# Patient Record
Sex: MALE | Race: WHITE | NOT HISPANIC OR LATINO
[De-identification: names, ages, dates, MRNs, and addresses within clinical notes are randomized per-mention and may not be internally consistent; named-entity substitution may affect disease eponyms.]

---

## 2017-04-17 ENCOUNTER — RX RENEWAL (OUTPATIENT)
Age: 62
End: 2017-04-17

## 2017-05-19 ENCOUNTER — RX RENEWAL (OUTPATIENT)
Age: 62
End: 2017-05-19

## 2018-05-03 ENCOUNTER — RX RENEWAL (OUTPATIENT)
Age: 63
End: 2018-05-03

## 2018-06-09 ENCOUNTER — RX RENEWAL (OUTPATIENT)
Age: 63
End: 2018-06-09

## 2018-09-28 ENCOUNTER — RX RENEWAL (OUTPATIENT)
Age: 63
End: 2018-09-28

## 2019-01-07 ENCOUNTER — RX RENEWAL (OUTPATIENT)
Age: 64
End: 2019-01-07

## 2019-02-06 ENCOUNTER — APPOINTMENT (OUTPATIENT)
Dept: GASTROENTEROLOGY | Facility: CLINIC | Age: 64
End: 2019-02-06
Payer: COMMERCIAL

## 2019-02-06 ENCOUNTER — APPOINTMENT (OUTPATIENT)
Dept: INTERNAL MEDICINE | Facility: CLINIC | Age: 64
End: 2019-02-06
Payer: COMMERCIAL

## 2019-02-06 VITALS
DIASTOLIC BLOOD PRESSURE: 86 MMHG | TEMPERATURE: 98.7 F | HEIGHT: 66 IN | SYSTOLIC BLOOD PRESSURE: 142 MMHG | OXYGEN SATURATION: 97 % | WEIGHT: 167 LBS | HEART RATE: 81 BPM | BODY MASS INDEX: 26.84 KG/M2

## 2019-02-06 VITALS
SYSTOLIC BLOOD PRESSURE: 140 MMHG | HEIGHT: 66 IN | BODY MASS INDEX: 26.84 KG/M2 | TEMPERATURE: 98.7 F | OXYGEN SATURATION: 97 % | WEIGHT: 167 LBS | HEART RATE: 81 BPM | DIASTOLIC BLOOD PRESSURE: 80 MMHG

## 2019-02-06 VITALS — SYSTOLIC BLOOD PRESSURE: 130 MMHG | DIASTOLIC BLOOD PRESSURE: 78 MMHG

## 2019-02-06 DIAGNOSIS — R19.8 OTHER SPECIFIED SYMPTOMS AND SIGNS INVOLVING THE DIGESTIVE SYSTEM AND ABDOMEN: ICD-10-CM

## 2019-02-06 DIAGNOSIS — Z12.5 ENCOUNTER FOR SCREENING FOR MALIGNANT NEOPLASM OF PROSTATE: ICD-10-CM

## 2019-02-06 DIAGNOSIS — R94.31 ABNORMAL ELECTROCARDIOGRAM [ECG] [EKG]: ICD-10-CM

## 2019-02-06 DIAGNOSIS — Z11.4 ENCOUNTER FOR SCREENING FOR HUMAN IMMUNODEFICIENCY VIRUS [HIV]: ICD-10-CM

## 2019-02-06 DIAGNOSIS — Z83.3 FAMILY HISTORY OF DIABETES MELLITUS: ICD-10-CM

## 2019-02-06 DIAGNOSIS — R01.1 CARDIAC MURMUR, UNSPECIFIED: ICD-10-CM

## 2019-02-06 PROCEDURE — 99396 PREV VISIT EST AGE 40-64: CPT | Mod: 25

## 2019-02-06 PROCEDURE — 99203 OFFICE O/P NEW LOW 30 MIN: CPT

## 2019-02-06 PROCEDURE — 93000 ELECTROCARDIOGRAM COMPLETE: CPT

## 2019-02-06 NOTE — HISTORY OF PRESENT ILLNESS
[FreeTextEntry1] : 62 yo with h/o GERD, ppi was given and patient ran out 2 weeks ago, and patient with c/o gerd daily. no n/v. normal bms, no brbpr, no melena.  unintentional weight loss. no abdominal pain\par \par s/p egd 2015,intestinal cell metaplasia, c/w Mckeon's esophagus,no  gastritis, no h.pylori.\par \par s/p colonoscopy 2015 diverticulosis, \par \par Father with h/o colon cancer, over the age of 50

## 2019-02-06 NOTE — REVIEW OF SYSTEMS
[As Noted in HPI] : as noted in HPI [Fever] : no fever [Chills] : no chills [Red Eyes] : eyes not red [Nosebleeds] : no nosebleeds [Chest Pain] : no chest pain [Shortness Of Breath] : no shortness of breath [Arthralgias] : no arthralgias [Joint Pain] : no joint pain [Skin Lesions] : no skin lesions [Anxiety] : anxiety [Depression] : no depression [Easy Bleeding] : no tendency for easy bleeding [Easy Bruising] : no tendency for easy bruising

## 2019-02-06 NOTE — PHYSICAL EXAM
[General Appearance - Alert] : alert [General Appearance - In No Acute Distress] : in no acute distress [General Appearance - Well Nourished] : well nourished [General Appearance - Well Developed] : well developed [Sclera] : the sclera and conjunctiva were normal [Hearing Threshold Finger Rub Not [place]] : hearing was normal [Auscultation Breath Sounds / Voice Sounds] : lungs were clear to auscultation bilaterally [Heart Rate And Rhythm] : heart rate was normal and rhythm regular [Heart Sounds] : normal S1 and S2 [No CVA Tenderness] : no ~M costovertebral angle tenderness [Abnormal Walk] : normal gait [Nail Clubbing] : no clubbing  or cyanosis of the fingernails [Skin Color & Pigmentation] : normal skin color and pigmentation [] : no rash [Skin Lesions] : no skin lesions [Sensation] : the sensory exam was normal to light touch and pinprick [Motor Exam] : the motor exam was normal [No Focal Deficits] : no focal deficits [Oriented To Time, Place, And Person] : oriented to person, place, and time [Bowel Sounds] : normal bowel sounds [Abdomen Soft] : soft [Abdomen Tenderness] : non-tender [FreeTextEntry1] : anxious

## 2019-02-12 LAB
ALBUMIN SERPL ELPH-MCNC: 4.3 G/DL
ALP BLD-CCNC: 107 U/L
ALT SERPL-CCNC: 16 U/L
ANION GAP SERPL CALC-SCNC: 12 MMOL/L
APPEARANCE: CLEAR
AST SERPL-CCNC: 21 U/L
BACTERIA: NEGATIVE
BASOPHILS # BLD AUTO: 0.01 K/UL
BASOPHILS NFR BLD AUTO: 0.2 %
BILIRUB SERPL-MCNC: 0.6 MG/DL
BILIRUBIN URINE: NEGATIVE
BLOOD URINE: NEGATIVE
BUN SERPL-MCNC: 16 MG/DL
CALCIUM SERPL-MCNC: 9 MG/DL
CHLORIDE SERPL-SCNC: 107 MMOL/L
CHOLEST SERPL-MCNC: 188 MG/DL
CHOLEST/HDLC SERPL: 3.5 RATIO
CO2 SERPL-SCNC: 23 MMOL/L
COLOR: YELLOW
CREAT SERPL-MCNC: 0.98 MG/DL
EOSINOPHIL # BLD AUTO: 0.11 K/UL
EOSINOPHIL NFR BLD AUTO: 2.7 %
GLUCOSE QUALITATIVE U: NEGATIVE MG/DL
GLUCOSE SERPL-MCNC: 115 MG/DL
HCT VFR BLD CALC: 44.6 %
HCV AB SER QL: NONREACTIVE
HCV S/CO RATIO: 0.13 S/CO
HDLC SERPL-MCNC: 54 MG/DL
HGB BLD-MCNC: 14.3 G/DL
HIV1+2 AB SPEC QL IA.RAPID: NONREACTIVE
IMM GRANULOCYTES NFR BLD AUTO: 0.5 %
KETONES URINE: NEGATIVE
LDLC SERPL CALC-MCNC: 118 MG/DL
LDLC SERPL DIRECT ASSAY-MCNC: 138 MG/DL
LEUKOCYTE ESTERASE URINE: NEGATIVE
LYMPHOCYTES # BLD AUTO: 0.96 K/UL
LYMPHOCYTES NFR BLD AUTO: 23.1 %
MAN DIFF?: NORMAL
MCHC RBC-ENTMCNC: 29.7 PG
MCHC RBC-ENTMCNC: 32.1 GM/DL
MCV RBC AUTO: 92.5 FL
MICROSCOPIC-UA: NORMAL
MONOCYTES # BLD AUTO: 0.4 K/UL
MONOCYTES NFR BLD AUTO: 9.6 %
NEUTROPHILS # BLD AUTO: 2.65 K/UL
NEUTROPHILS NFR BLD AUTO: 63.9 %
NITRITE URINE: NEGATIVE
PH URINE: 6
PLATELET # BLD AUTO: 187 K/UL
POTASSIUM SERPL-SCNC: 5 MMOL/L
PROT SERPL-MCNC: 6.9 G/DL
PROTEIN URINE: NEGATIVE MG/DL
PSA SERPL-MCNC: 1.52 NG/ML
RBC # BLD: 4.82 M/UL
RBC # FLD: 13.9 %
RED BLOOD CELLS URINE: 3 /HPF
SODIUM SERPL-SCNC: 142 MMOL/L
SPECIFIC GRAVITY URINE: 1.02
SQUAMOUS EPITHELIAL CELLS: 0 /HPF
T4 FREE SERPL-MCNC: 1 NG/DL
TRIGL SERPL-MCNC: 80 MG/DL
TSH SERPL-ACNC: 2.24 UIU/ML
UROBILINOGEN URINE: NEGATIVE MG/DL
WBC # FLD AUTO: 4.15 K/UL
WHITE BLOOD CELLS URINE: 1 /HPF

## 2019-02-22 ENCOUNTER — APPOINTMENT (OUTPATIENT)
Dept: GASTROENTEROLOGY | Facility: CLINIC | Age: 64
End: 2019-02-22
Payer: COMMERCIAL

## 2019-02-22 ENCOUNTER — LABORATORY RESULT (OUTPATIENT)
Age: 64
End: 2019-02-22

## 2019-02-22 PROCEDURE — 43239 EGD BIOPSY SINGLE/MULTIPLE: CPT

## 2019-05-03 ENCOUNTER — RX RENEWAL (OUTPATIENT)
Age: 64
End: 2019-05-03

## 2019-05-28 ENCOUNTER — APPOINTMENT (OUTPATIENT)
Dept: PHYSICAL MEDICINE AND REHAB | Facility: CLINIC | Age: 64
End: 2019-05-28

## 2019-08-12 ENCOUNTER — RX RENEWAL (OUTPATIENT)
Age: 64
End: 2019-08-12

## 2019-11-03 ENCOUNTER — RX RENEWAL (OUTPATIENT)
Age: 64
End: 2019-11-03

## 2020-02-15 ENCOUNTER — RX RENEWAL (OUTPATIENT)
Age: 65
End: 2020-02-15

## 2020-05-11 ENCOUNTER — RX RENEWAL (OUTPATIENT)
Age: 65
End: 2020-05-11

## 2020-05-12 ENCOUNTER — APPOINTMENT (OUTPATIENT)
Dept: GASTROENTEROLOGY | Facility: CLINIC | Age: 65
End: 2020-05-12
Payer: COMMERCIAL

## 2020-05-12 DIAGNOSIS — Z80.0 FAMILY HISTORY OF MALIGNANT NEOPLASM OF DIGESTIVE ORGANS: ICD-10-CM

## 2020-05-12 PROCEDURE — 99214 OFFICE O/P EST MOD 30 MIN: CPT | Mod: 95

## 2020-05-12 NOTE — HISTORY OF PRESENT ILLNESS
[Home] : at home, [unfilled] , at the time of the visit. [Other Location: e.g. Home (Enter Location, City,State)___] : at [unfilled] [Patient] : the patient [Self] : self [FreeTextEntry1] : 65 yo male with h/o gerd, s/p egd reveals salmon colored mucosa, c/w marcos esophagus. Patient with normal bms, no brbpr, no melena. no weight loss.\par \par s/p colonoscopy in 2015 diverticulosis\par \par family h/o father with h/o colon cancer

## 2020-05-12 NOTE — ASSESSMENT
[FreeTextEntry1] : 1. h/o marcos's esophagus \par \par plan ppi daily\par            antireflux diet\par \par 2. family h/o colon cancer\par \par plan colonoscopy to be scheduled this year\par \par

## 2020-05-12 NOTE — REVIEW OF SYSTEMS
[Fever] : no fever [Chills] : no chills [Eyesight Problems] : no eyesight problems [Loss Of Hearing] : no hearing loss [Heart Rate Is Slow] : the heart rate was not slow [Chest Pain] : no chest pain [Cough] : no cough [SOB on Exertion] : no shortness of breath during exertion [Dysuria] : no dysuria [As Noted in HPI] : as noted in HPI [Arthralgias] : no arthralgias [Confused] : no confusion [Joint Pain] : no joint pain [Skin Lesions] : no skin lesions [Dizziness] : no dizziness [Anxiety] : no anxiety [Muscle Weakness] : no muscle weakness [Depression] : no depression [Easy Bleeding] : no tendency for easy bleeding

## 2020-07-10 DIAGNOSIS — Z01.818 ENCOUNTER FOR OTHER PREPROCEDURAL EXAMINATION: ICD-10-CM

## 2020-07-11 ENCOUNTER — TRANSCRIPTION ENCOUNTER (OUTPATIENT)
Age: 65
End: 2020-07-11

## 2020-07-11 ENCOUNTER — APPOINTMENT (OUTPATIENT)
Dept: DISASTER EMERGENCY | Facility: CLINIC | Age: 65
End: 2020-07-11

## 2020-07-11 LAB — SARS-COV-2 N GENE NPH QL NAA+PROBE: NOT DETECTED

## 2020-07-14 ENCOUNTER — APPOINTMENT (OUTPATIENT)
Dept: GASTROENTEROLOGY | Facility: CLINIC | Age: 65
End: 2020-07-14
Payer: COMMERCIAL

## 2020-07-14 VITALS — TEMPERATURE: 98.2 F

## 2020-07-14 PROCEDURE — 45378 DIAGNOSTIC COLONOSCOPY: CPT

## 2020-08-04 ENCOUNTER — RX RENEWAL (OUTPATIENT)
Age: 65
End: 2020-08-04

## 2020-10-30 ENCOUNTER — RX RENEWAL (OUTPATIENT)
Age: 65
End: 2020-10-30

## 2021-01-26 ENCOUNTER — RX RENEWAL (OUTPATIENT)
Age: 66
End: 2021-01-26

## 2021-04-20 ENCOUNTER — RX RENEWAL (OUTPATIENT)
Age: 66
End: 2021-04-20

## 2021-07-13 ENCOUNTER — APPOINTMENT (OUTPATIENT)
Dept: INTERNAL MEDICINE | Facility: CLINIC | Age: 66
End: 2021-07-13
Payer: MEDICARE

## 2021-07-13 VITALS
HEART RATE: 70 BPM | SYSTOLIC BLOOD PRESSURE: 160 MMHG | DIASTOLIC BLOOD PRESSURE: 90 MMHG | OXYGEN SATURATION: 98 % | HEIGHT: 66 IN | TEMPERATURE: 98.1 F | WEIGHT: 184 LBS | BODY MASS INDEX: 29.57 KG/M2

## 2021-07-13 DIAGNOSIS — R03.0 ELEVATED BLOOD-PRESSURE READING, W/OUT DIAGNOSIS OF HYPERTENSION: ICD-10-CM

## 2021-07-13 PROCEDURE — 99213 OFFICE O/P EST LOW 20 MIN: CPT

## 2021-07-19 ENCOUNTER — RX RENEWAL (OUTPATIENT)
Age: 66
End: 2021-07-19

## 2021-07-20 ENCOUNTER — APPOINTMENT (OUTPATIENT)
Dept: INTERNAL MEDICINE | Facility: CLINIC | Age: 66
End: 2021-07-20
Payer: MEDICARE

## 2021-07-20 VITALS — SYSTOLIC BLOOD PRESSURE: 160 MMHG | DIASTOLIC BLOOD PRESSURE: 100 MMHG

## 2021-07-20 VITALS
HEIGHT: 66 IN | OXYGEN SATURATION: 98 % | TEMPERATURE: 97.9 F | SYSTOLIC BLOOD PRESSURE: 162 MMHG | HEART RATE: 87 BPM | DIASTOLIC BLOOD PRESSURE: 90 MMHG | WEIGHT: 180 LBS | BODY MASS INDEX: 28.93 KG/M2

## 2021-07-20 DIAGNOSIS — B02.9 ZOSTER W/OUT COMPLICATIONS: ICD-10-CM

## 2021-07-20 PROCEDURE — 99213 OFFICE O/P EST LOW 20 MIN: CPT

## 2021-07-23 ENCOUNTER — APPOINTMENT (OUTPATIENT)
Dept: CARDIOLOGY | Facility: CLINIC | Age: 66
End: 2021-07-23
Payer: MEDICARE

## 2021-07-23 ENCOUNTER — NON-APPOINTMENT (OUTPATIENT)
Age: 66
End: 2021-07-23

## 2021-07-23 VITALS
BODY MASS INDEX: 28.25 KG/M2 | HEART RATE: 89 BPM | OXYGEN SATURATION: 96 % | DIASTOLIC BLOOD PRESSURE: 86 MMHG | SYSTOLIC BLOOD PRESSURE: 142 MMHG | WEIGHT: 175 LBS

## 2021-07-23 VITALS — DIASTOLIC BLOOD PRESSURE: 82 MMHG | SYSTOLIC BLOOD PRESSURE: 132 MMHG

## 2021-07-23 DIAGNOSIS — Z81.8 FAMILY HISTORY OF OTHER MENTAL AND BEHAVIORAL DISORDERS: ICD-10-CM

## 2021-07-23 DIAGNOSIS — Z82.3 FAMILY HISTORY OF STROKE: ICD-10-CM

## 2021-07-23 DIAGNOSIS — E66.3 OVERWEIGHT: ICD-10-CM

## 2021-07-23 PROCEDURE — 99205 OFFICE O/P NEW HI 60 MIN: CPT

## 2021-07-23 PROCEDURE — 93000 ELECTROCARDIOGRAM COMPLETE: CPT

## 2021-07-29 ENCOUNTER — APPOINTMENT (OUTPATIENT)
Dept: CARDIOLOGY | Facility: CLINIC | Age: 66
End: 2021-07-29

## 2021-08-09 PROBLEM — E66.3 OVERWEIGHT (BMI 25.0-29.9): Status: ACTIVE | Noted: 2021-08-09

## 2021-08-09 NOTE — CARDIOLOGY SUMMARY
[de-identified] : 7/23/2021 sinus 87 bpm, left atrial enlargement, LAFB, fractionated QRS, poor R-wave progression

## 2021-08-09 NOTE — DISCUSSION/SUMMARY
[FreeTextEntry1] : \par Counselled to reduce salt intake.\par Continue diet and exercise with the goal of weight loss.\par \par Echocardiogram to evaluate for structural heart disease given abnormal baseline ECG.

## 2021-08-09 NOTE — HISTORY OF PRESENT ILLNESS
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 \par \sobeida Had a visit with his primary care provider and was seen to be borderline obese with hypertension. He was started on an ARB (olmesartan 20 mg daily), and he will follow-up with her in one month for labs.\par Recent shingles infection on right upper extremity. He was stressed over his 17 year-old cat having a CVA.\par \par Patient exercises regularly, using an elliptical and a stationary bicycle. He also does resistance training, including weights. \par He has been losing weight by diet and exercise.\par \par Patient has received both doses of Moderna's Covid-19 vaccine. \par

## 2021-08-11 ENCOUNTER — APPOINTMENT (OUTPATIENT)
Dept: INTERNAL MEDICINE | Facility: CLINIC | Age: 66
End: 2021-08-11
Payer: MEDICARE

## 2021-08-11 VITALS
HEART RATE: 81 BPM | HEIGHT: 66 IN | SYSTOLIC BLOOD PRESSURE: 132 MMHG | OXYGEN SATURATION: 98 % | WEIGHT: 173 LBS | TEMPERATURE: 97.1 F | DIASTOLIC BLOOD PRESSURE: 84 MMHG | BODY MASS INDEX: 27.8 KG/M2

## 2021-08-11 VITALS — DIASTOLIC BLOOD PRESSURE: 80 MMHG | SYSTOLIC BLOOD PRESSURE: 120 MMHG

## 2021-08-11 DIAGNOSIS — R73.9 HYPERGLYCEMIA, UNSPECIFIED: ICD-10-CM

## 2021-08-11 DIAGNOSIS — B02.29 OTHER POSTHERPETIC NERVOUS SYSTEM INVOLVEMENT: ICD-10-CM

## 2021-08-11 PROCEDURE — 99214 OFFICE O/P EST MOD 30 MIN: CPT | Mod: 25

## 2021-08-11 PROCEDURE — 36415 COLL VENOUS BLD VENIPUNCTURE: CPT

## 2021-08-12 LAB
ALBUMIN SERPL ELPH-MCNC: 4.3 G/DL
ALP BLD-CCNC: 128 U/L
ALT SERPL-CCNC: 16 U/L
ANION GAP SERPL CALC-SCNC: 12 MMOL/L
AST SERPL-CCNC: 23 U/L
BILIRUB SERPL-MCNC: 0.5 MG/DL
BUN SERPL-MCNC: 17 MG/DL
CALCIUM SERPL-MCNC: 9.7 MG/DL
CHLORIDE SERPL-SCNC: 104 MMOL/L
CHOLEST SERPL-MCNC: 241 MG/DL
CO2 SERPL-SCNC: 25 MMOL/L
CREAT SERPL-MCNC: 1.27 MG/DL
ESTIMATED AVERAGE GLUCOSE: 123 MG/DL
GLUCOSE SERPL-MCNC: 112 MG/DL
HBA1C MFR BLD HPLC: 5.9 %
HDLC SERPL-MCNC: 46 MG/DL
LDLC SERPL CALC-MCNC: 174 MG/DL
LDLC SERPL DIRECT ASSAY-MCNC: 174 MG/DL
NONHDLC SERPL-MCNC: 195 MG/DL
POTASSIUM SERPL-SCNC: 5.3 MMOL/L
PROT SERPL-MCNC: 7.9 G/DL
SODIUM SERPL-SCNC: 141 MMOL/L
TRIGL SERPL-MCNC: 105 MG/DL

## 2021-08-18 ENCOUNTER — APPOINTMENT (OUTPATIENT)
Dept: CARDIOLOGY | Facility: CLINIC | Age: 66
End: 2021-08-18
Payer: MEDICARE

## 2021-08-18 PROCEDURE — 93306 TTE W/DOPPLER COMPLETE: CPT

## 2021-10-12 ENCOUNTER — RX RENEWAL (OUTPATIENT)
Age: 66
End: 2021-10-12

## 2021-11-08 ENCOUNTER — APPOINTMENT (OUTPATIENT)
Dept: INTERNAL MEDICINE | Facility: CLINIC | Age: 66
End: 2021-11-08
Payer: MEDICARE

## 2021-11-08 VITALS
TEMPERATURE: 97.6 F | BODY MASS INDEX: 27.32 KG/M2 | OXYGEN SATURATION: 98 % | HEART RATE: 75 BPM | HEIGHT: 66 IN | SYSTOLIC BLOOD PRESSURE: 128 MMHG | DIASTOLIC BLOOD PRESSURE: 70 MMHG | WEIGHT: 170 LBS

## 2021-11-08 DIAGNOSIS — E78.5 HYPERLIPIDEMIA, UNSPECIFIED: ICD-10-CM

## 2021-11-08 DIAGNOSIS — R73.01 IMPAIRED FASTING GLUCOSE: ICD-10-CM

## 2021-11-08 DIAGNOSIS — I10 ESSENTIAL (PRIMARY) HYPERTENSION: ICD-10-CM

## 2021-11-08 DIAGNOSIS — K21.9 GASTRO-ESOPHAGEAL REFLUX DISEASE W/OUT ESOPHAGITIS: ICD-10-CM

## 2021-11-08 DIAGNOSIS — Z00.00 ENCOUNTER FOR GENERAL ADULT MEDICAL EXAMINATION W/OUT ABNORMAL FINDINGS: ICD-10-CM

## 2021-11-08 PROCEDURE — G0009: CPT

## 2021-11-08 PROCEDURE — 36415 COLL VENOUS BLD VENIPUNCTURE: CPT

## 2021-11-08 PROCEDURE — 99214 OFFICE O/P EST MOD 30 MIN: CPT | Mod: 25

## 2021-11-08 PROCEDURE — 90732 PPSV23 VACC 2 YRS+ SUBQ/IM: CPT

## 2021-11-08 PROCEDURE — G0402 INITIAL PREVENTIVE EXAM: CPT

## 2021-11-08 RX ORDER — CLOBETASOL PROPIONATE 0.5 MG/G
0.05 CREAM TOPICAL
Qty: 15 | Refills: 0 | Status: DISCONTINUED | COMMUNITY
Start: 2021-07-11

## 2021-11-08 RX ORDER — GABAPENTIN 300 MG/1
300 CAPSULE ORAL
Qty: 90 | Refills: 0 | Status: DISCONTINUED | COMMUNITY
Start: 2021-07-13 | End: 2021-11-08

## 2021-11-08 RX ORDER — VALACYCLOVIR 1 G/1
1 TABLET, FILM COATED ORAL
Qty: 21 | Refills: 0 | Status: DISCONTINUED | COMMUNITY
Start: 2021-07-11

## 2021-11-08 RX ORDER — METHYLPREDNISOLONE 4 MG/1
4 TABLET ORAL
Qty: 1 | Refills: 0 | Status: DISCONTINUED | COMMUNITY
Start: 2021-07-13 | End: 2021-11-08

## 2021-11-14 ENCOUNTER — NON-APPOINTMENT (OUTPATIENT)
Age: 66
End: 2021-11-14

## 2021-11-15 ENCOUNTER — MED ADMIN CHARGE (OUTPATIENT)
Age: 66
End: 2021-11-15

## 2021-11-15 ENCOUNTER — APPOINTMENT (OUTPATIENT)
Dept: GASTROENTEROLOGY | Facility: CLINIC | Age: 66
End: 2021-11-15
Payer: MEDICARE

## 2021-11-15 VITALS
BODY MASS INDEX: 27.32 KG/M2 | WEIGHT: 170 LBS | SYSTOLIC BLOOD PRESSURE: 112 MMHG | HEIGHT: 66 IN | TEMPERATURE: 97.9 F | DIASTOLIC BLOOD PRESSURE: 80 MMHG | HEART RATE: 69 BPM

## 2021-11-15 DIAGNOSIS — K22.70 BARRETT'S ESOPHAGUS W/OUT DYSPLASIA: ICD-10-CM

## 2021-11-15 LAB
ALBUMIN SERPL ELPH-MCNC: 4.5 G/DL
ALP BLD-CCNC: 134 U/L
ALT SERPL-CCNC: 18 U/L
ANION GAP SERPL CALC-SCNC: 11 MMOL/L
APPEARANCE: CLEAR
AST SERPL-CCNC: 24 U/L
BACTERIA: NEGATIVE
BASOPHILS # BLD AUTO: 0.04 K/UL
BASOPHILS NFR BLD AUTO: 0.7 %
BILIRUB SERPL-MCNC: 1 MG/DL
BILIRUBIN URINE: NEGATIVE
BLOOD URINE: NEGATIVE
BUN SERPL-MCNC: 17 MG/DL
CALCIUM SERPL-MCNC: 9.5 MG/DL
CHLORIDE SERPL-SCNC: 107 MMOL/L
CHOLEST SERPL-MCNC: 141 MG/DL
CO2 SERPL-SCNC: 23 MMOL/L
COLOR: YELLOW
CREAT SERPL-MCNC: 1.26 MG/DL
EOSINOPHIL # BLD AUTO: 0.11 K/UL
EOSINOPHIL NFR BLD AUTO: 1.9 %
ESTIMATED AVERAGE GLUCOSE: 114 MG/DL
GLUCOSE QUALITATIVE U: NEGATIVE
GLUCOSE SERPL-MCNC: 99 MG/DL
HBA1C MFR BLD HPLC: 5.6 %
HCT VFR BLD CALC: 41.6 %
HDLC SERPL-MCNC: 44 MG/DL
HGB BLD-MCNC: 13.3 G/DL
HYALINE CASTS: 1 /LPF
IMM GRANULOCYTES NFR BLD AUTO: 0.3 %
KETONES URINE: NEGATIVE
LDLC SERPL CALC-MCNC: 79 MG/DL
LDLC SERPL DIRECT ASSAY-MCNC: 75 MG/DL
LEUKOCYTE ESTERASE URINE: NEGATIVE
LYMPHOCYTES # BLD AUTO: 0.94 K/UL
LYMPHOCYTES NFR BLD AUTO: 15.9 %
MAN DIFF?: NORMAL
MCHC RBC-ENTMCNC: 31.2 PG
MCHC RBC-ENTMCNC: 32 GM/DL
MCV RBC AUTO: 97.7 FL
MICROSCOPIC-UA: NORMAL
MONOCYTES # BLD AUTO: 0.57 K/UL
MONOCYTES NFR BLD AUTO: 9.6 %
NEUTROPHILS # BLD AUTO: 4.23 K/UL
NEUTROPHILS NFR BLD AUTO: 71.6 %
NITRITE URINE: NEGATIVE
NONHDLC SERPL-MCNC: 97 MG/DL
PH URINE: 5.5
PLATELET # BLD AUTO: 200 K/UL
POTASSIUM SERPL-SCNC: 5 MMOL/L
PROT SERPL-MCNC: 7.3 G/DL
PROTEIN URINE: NEGATIVE
PSA SERPL-MCNC: 1.72 NG/ML
RBC # BLD: 4.26 M/UL
RBC # FLD: 13.2 %
RED BLOOD CELLS URINE: 1 /HPF
SODIUM SERPL-SCNC: 141 MMOL/L
SPECIFIC GRAVITY URINE: 1.02
SQUAMOUS EPITHELIAL CELLS: 0 /HPF
T4 FREE SERPL-MCNC: 1.1 NG/DL
TRIGL SERPL-MCNC: 89 MG/DL
TSH SERPL-ACNC: 2.51 UIU/ML
UROBILINOGEN URINE: NORMAL
WBC # FLD AUTO: 5.91 K/UL
WHITE BLOOD CELLS URINE: 1 /HPF

## 2021-11-15 PROCEDURE — 99214 OFFICE O/P EST MOD 30 MIN: CPT

## 2021-11-15 NOTE — REVIEW OF SYSTEMS
[As Noted in HPI] : as noted in HPI [Fever] : no fever [Chills] : no chills [Nosebleeds] : no nosebleeds [Nasal Discharge] : no nasal discharge [Heart Rate Is Slow] : the heart rate was not slow [Heart Rate Is Fast] : the heart rate was not fast [Chest Pain] : no chest pain [Hesitancy] : no urinary hesitancy [Dizziness] : no dizziness [Anxiety] : no anxiety [Depression] : no depression [Muscle Weakness] : no muscle weakness [Easy Bleeding] : no tendency for easy bleeding [Easy Bruising] : no tendency for easy bruising

## 2021-11-15 NOTE — PHYSICAL EXAM
[General Appearance - Alert] : alert [General Appearance - In No Acute Distress] : in no acute distress [General Appearance - Well Nourished] : well nourished [General Appearance - Well Developed] : well developed [Sclera] : the sclera and conjunctiva were normal [Hearing Threshold Finger Rub Not [place]] : hearing was normal [Auscultation Breath Sounds / Voice Sounds] : lungs were clear to auscultation bilaterally [Heart Sounds] : normal S1 and S2 [Bowel Sounds] : normal bowel sounds [Abdomen Soft] : soft [Abdomen Tenderness] : non-tender [No CVA Tenderness] : no ~M costovertebral angle tenderness [No Focal Deficits] : no focal deficits [Oriented To Time, Place, And Person] : oriented to person, place, and time

## 2021-11-15 NOTE — HISTORY OF PRESENT ILLNESS
[FreeTextEntry1] : 65 yo with h/o GERD, s/p EGD c/w Mckeon's esophagus, salmon colored.normal bms,no brbpr, no melena. intentional  weight loss. no n/v.  rare gerd,  normal bms,\par \par sp colonsocopy reveals diverticulosis, ih 2020\par s/p egd revals single tongue of salmon colored mucosa. 2019

## 2021-11-15 NOTE — ASSESSMENT
[FreeTextEntry1] : 1. h/o Mckeon's esophagus, denies chronic gerd\par \par plan ppi as needed, taper off\par   egd for surveillance in 2022\par \par Discussed risks including but not limited to bleeding,infection,drug reaction, perforation,missed lesion,benefits and alternatives of colonoscopy/egd with patient  including no\par treatment and patient consents to procedure.\par \par 2. hoarseness in throat,\par \par plan ent evaluation

## 2022-02-01 ENCOUNTER — RX RENEWAL (OUTPATIENT)
Age: 67
End: 2022-02-01

## 2022-02-04 ENCOUNTER — APPOINTMENT (OUTPATIENT)
Dept: DISASTER EMERGENCY | Facility: CLINIC | Age: 67
End: 2022-02-04

## 2022-02-08 ENCOUNTER — APPOINTMENT (OUTPATIENT)
Dept: GASTROENTEROLOGY | Facility: CLINIC | Age: 67
End: 2022-02-08
Payer: MEDICARE

## 2022-02-08 ENCOUNTER — LABORATORY RESULT (OUTPATIENT)
Age: 67
End: 2022-02-08

## 2022-02-08 PROCEDURE — 45380 COLONOSCOPY AND BIOPSY: CPT

## 2022-02-08 PROCEDURE — 43239 EGD BIOPSY SINGLE/MULTIPLE: CPT

## 2022-03-29 ENCOUNTER — RX RENEWAL (OUTPATIENT)
Age: 67
End: 2022-03-29

## 2022-05-16 ENCOUNTER — RX RENEWAL (OUTPATIENT)
Age: 67
End: 2022-05-16

## 2022-05-25 ENCOUNTER — RX RENEWAL (OUTPATIENT)
Age: 67
End: 2022-05-25

## 2022-07-19 ENCOUNTER — RX RENEWAL (OUTPATIENT)
Age: 67
End: 2022-07-19

## 2022-09-13 ENCOUNTER — RX RENEWAL (OUTPATIENT)
Age: 67
End: 2022-09-13

## 2022-11-10 ENCOUNTER — RX RENEWAL (OUTPATIENT)
Age: 67
End: 2022-11-10

## 2023-01-05 ENCOUNTER — RX RENEWAL (OUTPATIENT)
Age: 68
End: 2023-01-05

## 2023-03-08 ENCOUNTER — RX RENEWAL (OUTPATIENT)
Age: 68
End: 2023-03-08

## 2023-05-16 ENCOUNTER — RX RENEWAL (OUTPATIENT)
Age: 68
End: 2023-05-16

## 2023-07-17 ENCOUNTER — RX RENEWAL (OUTPATIENT)
Age: 68
End: 2023-07-17

## 2023-08-08 ENCOUNTER — APPOINTMENT (OUTPATIENT)
Dept: GASTROENTEROLOGY | Facility: CLINIC | Age: 68
End: 2023-08-08
Payer: MEDICARE

## 2023-08-08 VITALS
WEIGHT: 170 LBS | BODY MASS INDEX: 27.32 KG/M2 | SYSTOLIC BLOOD PRESSURE: 140 MMHG | HEIGHT: 66 IN | DIASTOLIC BLOOD PRESSURE: 80 MMHG | HEART RATE: 76 BPM | OXYGEN SATURATION: 98 %

## 2023-08-08 DIAGNOSIS — R12 HEARTBURN: ICD-10-CM

## 2023-08-08 PROCEDURE — 99214 OFFICE O/P EST MOD 30 MIN: CPT

## 2023-08-08 NOTE — HISTORY OF PRESENT ILLNESS
[FreeTextEntry1] : 67 yo male s/p egd/colonoscopy in 02/2022 revealed short segment Mckeon's esophagus and mild gastritis,biopsieds, s/p colonosocy prominent ileocecal valve, diverticulosis, IH repeat colonoscopy in 5 years. pathology c/w intestinal metaplasia. patient here for follow up. reports ppi helps him with his symptoms. occassional brbpr. no weight loss. no n/v.   familyh/o colon cancer father

## 2023-08-08 NOTE — REVIEW OF SYSTEMS
[Fever] : no fever [Red Eyes] : eyes not red [Sore Throat] : no sore throat [Chest Pain] : no chest pain [SOB on Exertion] : no shortness of breath during exertion [As Noted in HPI] : as noted in HPI [Rash] : no rash [Joint Stiffness] : no joint stiffness [Skin Wound] : no skin wound [Dizziness] : no dizziness [Anxiety] : no anxiety [Muscle Weakness] : no muscle weakness [Easy Bruising] : no tendency for easy bruising

## 2023-08-08 NOTE — ASSESSMENT
[FreeTextEntry1] : 1. GERD, ? short segment Mckeon's esophagus  plan ppi daily  repeat egd in 2025  2.  rectal bleeding,small amount probably related to hemorrhoids  plan referral to colorectal surgery

## 2023-08-08 NOTE — PHYSICAL EXAM
[Normal] : normal hearing, normal lips.gums, normal oropharynx [Normal Lips/Gums] : the lips and gums were normal [Normal Appearance] : the appearance of the neck was normal [No Respiratory Distress] : no respiratory distress [Normal S1, S2] : normal S1 and S2 [Bowel Sounds] : normal bowel sounds [Abdomen Tenderness] : non-tender [No CVA Tenderness] : no CVA  tenderness [Abnormal Walk] : normal gait [] : no rash [No Focal Deficits] : no focal deficits [Oriented To Time, Place, And Person] : oriented to person, place, and time

## 2023-09-01 NOTE — ASSESSMENT
[FreeTextEntry1] : 1.GERD with h/o Mckeon's esophagus , no dysplasia\par \par Plan; recommend ppi daily\par           antireflux diet\par          recommend egd for surveillance of Mckeon's esophagus\par \par Discussed risks including but not limited to bleeding,infection,drug reaction, perforation,missed lesion,benefits and alternatives of colonoscopy/egd with patient  including no\par treatment and patient consents to procedure.\par \par 2. Family h/o colon cancer, father\par \par plan recommend colonoscopy for screening in 2020\par \par 3. RHCM\par \par plan pt scheduled for CPE with Yvette NP today\par \par 4. anxiety\par \par plan recommend pt f/u with NP Yvette today Statement Selected

## 2023-09-05 ENCOUNTER — APPOINTMENT (OUTPATIENT)
Dept: SURGERY | Facility: CLINIC | Age: 68
End: 2023-09-05
Payer: MEDICARE

## 2023-09-05 VITALS
SYSTOLIC BLOOD PRESSURE: 136 MMHG | BODY MASS INDEX: 29.37 KG/M2 | TEMPERATURE: 97.8 F | HEART RATE: 72 BPM | WEIGHT: 172 LBS | RESPIRATION RATE: 17 BRPM | HEIGHT: 64 IN | DIASTOLIC BLOOD PRESSURE: 86 MMHG | OXYGEN SATURATION: 99 %

## 2023-09-05 DIAGNOSIS — K64.8 OTHER HEMORRHOIDS: ICD-10-CM

## 2023-09-05 PROCEDURE — 46600 DIAGNOSTIC ANOSCOPY SPX: CPT

## 2023-09-05 PROCEDURE — 99203 OFFICE O/P NEW LOW 30 MIN: CPT | Mod: 25

## 2023-09-05 RX ORDER — OLMESARTAN MEDOXOMIL 20 MG/1
20 TABLET, FILM COATED ORAL DAILY
Qty: 90 | Refills: 3 | Status: DISCONTINUED | COMMUNITY
Start: 2021-07-20 | End: 2023-09-05

## 2023-09-05 RX ORDER — OMEPRAZOLE 40 MG/1
40 CAPSULE, DELAYED RELEASE ORAL
Qty: 30 | Refills: 1 | Status: DISCONTINUED | COMMUNITY
Start: 2019-08-12 | End: 2023-09-05

## 2023-09-05 RX ORDER — ATORVASTATIN CALCIUM 20 MG/1
20 TABLET, FILM COATED ORAL DAILY
Qty: 90 | Refills: 3 | Status: DISCONTINUED | COMMUNITY
Start: 2021-08-12 | End: 2023-09-05

## 2023-09-05 NOTE — CONSULT LETTER
[Dear  ___] : Dear  [unfilled], [Consult Letter:] : I had the pleasure of evaluating your patient, [unfilled]. [Please see my note below.] : Please see my note below. [Consult Closing:] : Thank you very much for allowing me to participate in the care of this patient.  If you have any questions, please do not hesitate to contact me. [Sincerely,] : Sincerely, [FreeTextEntry3] : Jhonny Barlow M.D., F.A.C.S, F.A.S.C.R.S [DrCori  ___] : Dr. GOMEZ

## 2023-09-05 NOTE — HISTORY OF PRESENT ILLNESS
[FreeTextEntry1] : Jhonny is a 68 yr. old male is here consultation for possible hemorrhoids.  Colonoscopy 2/8/2022 -  Moderate diverticulosis of the whole colon. Stage 1 internal hemorrhoids. Prominent ileocecal valve Pathology : Ileocecal valve polyp: Hyperplastic polyp 2. Stomach antral biopsy:     - Gastric antral-type mucosa with foveolar hyperplasia. No      Helicobacter pylori seen. No intestinal metaplasia is seen. No      carcinoma identified. 3. Stomach body biopsy:     - Gastric fundic-type mucosa, negative for inflammation. No      Helicobacter pylori is seen.  No carcinoma identified. 4. Gastroesophageal junction biopsy:     - Squamous and gastric-type mucosa. The squamous mucosa shows basal      cell hyperplasia but no eosinophils. This is suggestive of reflux.      Gastric-type mucosa shows intestinal metaplasia which may represent      part of Barretts esophagus. No dysplasia seen.  Today pt reports no pain. Has bleeding on tp, does feel prolapsed tissue. Not taking any anticoagulants.

## 2023-09-05 NOTE — ASSESSMENT
[FreeTextEntry1] : In summary the patient has enlarged friable circumferential prolapsing internal hemorrhoids.  I recommended rubber band ligations.  I explained the risks benefits and alternatives including the rare complications of bleeding infection prolonged postprocedural discomfort and potential need to repeat the procedure.

## 2023-09-05 NOTE — PHYSICAL EXAM
[Normal Breath Sounds] : Normal breath sounds [Normal Heart Sounds] : normal heart sounds [Alert] : alert [Oriented to Person] : oriented to person [Oriented to Place] : oriented to place [Oriented to Time] : oriented to time [Calm] : calm [Abdomen Masses] : No abdominal masses [Abdomen Tenderness] : ~T No ~M abdominal tenderness [No HSM] : no hepatosplenomegaly [de-identified] : Perianal inspection reveals no enlarged external hemorrhoids.  There is no fissure fistula or abscess.  Anoscopy reveals enlarged friable circumferential internal hemorrhoids with evidence of prolapse and recent bleeding.  Distal rectal mucosa is otherwise normal. [de-identified] : WNL [de-identified] : WNL [de-identified] : ROXANNAL [de-identified] : WNL ROM [de-identified] : WNL

## 2023-09-22 ENCOUNTER — EMERGENCY (EMERGENCY)
Facility: HOSPITAL | Age: 68
LOS: 1 days | Discharge: HOME | End: 2023-09-22
Attending: EMERGENCY MEDICINE
Payer: MEDICARE

## 2023-09-22 VITALS
HEART RATE: 75 BPM | SYSTOLIC BLOOD PRESSURE: 147 MMHG | RESPIRATION RATE: 18 BRPM | HEIGHT: 62 IN | OXYGEN SATURATION: 97 % | TEMPERATURE: 99 F | WEIGHT: 171.96 LBS | DIASTOLIC BLOOD PRESSURE: 83 MMHG

## 2023-09-22 PROCEDURE — 73562 X-RAY EXAM OF KNEE 3: CPT

## 2023-09-22 PROCEDURE — 73502 X-RAY EXAM HIP UNI 2-3 VIEWS: CPT | Mod: 26,RT

## 2023-09-22 PROCEDURE — 73552 X-RAY EXAM OF FEMUR 2/>: CPT | Mod: 26,RT

## 2023-09-22 PROCEDURE — 73552 X-RAY EXAM OF FEMUR 2/>: CPT

## 2023-09-22 PROCEDURE — 72170 X-RAY EXAM OF PELVIS: CPT

## 2023-09-22 PROCEDURE — 99284 EMERGENCY DEPT VISIT MOD MDM: CPT | Mod: 25

## 2023-09-22 PROCEDURE — 73562 X-RAY EXAM OF KNEE 3: CPT | Mod: 26,RT

## 2023-09-22 PROCEDURE — 73502 X-RAY EXAM HIP UNI 2-3 VIEWS: CPT

## 2023-09-22 PROCEDURE — 72170 X-RAY EXAM OF PELVIS: CPT | Mod: 26,XU

## 2023-09-22 PROCEDURE — 99284 EMERGENCY DEPT VISIT MOD MDM: CPT | Mod: FS

## 2023-09-22 RX ORDER — OXYCODONE HYDROCHLORIDE 5 MG/1
1 TABLET ORAL
Qty: 8 | Refills: 0
Start: 2023-09-22 | End: 2023-09-23

## 2023-09-22 RX ORDER — ACETAMINOPHEN 500 MG
975 TABLET ORAL ONCE
Refills: 0 | Status: COMPLETED | OUTPATIENT
Start: 2023-09-22 | End: 2023-09-22

## 2023-09-22 RX ORDER — LIDOCAINE 4 G/100G
1 CREAM TOPICAL ONCE
Refills: 0 | Status: COMPLETED | OUTPATIENT
Start: 2023-09-22 | End: 2023-09-22

## 2023-09-22 RX ORDER — OXYCODONE HYDROCHLORIDE 5 MG/1
5 TABLET ORAL ONCE
Refills: 0 | Status: DISCONTINUED | OUTPATIENT
Start: 2023-09-22 | End: 2023-09-22

## 2023-09-22 RX ADMIN — LIDOCAINE 1 PATCH: 4 CREAM TOPICAL at 16:38

## 2023-09-22 RX ADMIN — OXYCODONE HYDROCHLORIDE 5 MILLIGRAM(S): 5 TABLET ORAL at 16:37

## 2023-09-22 RX ADMIN — Medication 975 MILLIGRAM(S): at 16:37

## 2023-09-22 NOTE — ED PROVIDER NOTE - ATTENDING APP SHARED VISIT CONTRIBUTION OF CARE
Attending MD Tolentino:   I personally have seen and examined this patient.  NP note reviewed and agree on plan of care and except where noted.  See MDM for details. Please see my MDM for further details.

## 2023-09-22 NOTE — ED PROVIDER NOTE - NSFOLLOWUPINSTRUCTIONS_ED_ALL_ED_FT
Please see the information of hip pain.    Rest.    Keep continue your current medications as prescribed.    Take Ibuprofen (600mg every 8hours with food) or/and Tylenol (2 tablets of 500mg every 8hours) as needed for pain.    Salonpas with Lidocaine patch to pain area as package directed.     Oxycodone as prescribed for severe pain with cautions of drowsiness and constipation.    Stool softener as needed for constipation.    Follow up with your pain management DrCori as scheduled on Tuesday.    Return for any concerns, fever, numbness, weakness, urinary/bowel problems, or worsening pain.

## 2023-09-22 NOTE — ED PROVIDER NOTE - OBJECTIVE STATEMENT
67yo male pt with PMHx of HTN, HLD, Prediabetic (no med), Chronic low back pain c/o low back pain radiating pain to right thigh for 3days. Denies injury but daily exercise including weight lifting. Reports he's had chronic low back pain for a while and noticed recurrent low back pain with radiating pain to hip and anterior thigh 3days ago. Denies fever, chills, or recent sickness. Denies CP/SOB/ABD pain or N/V. Denies urinary or bowel problems. 67yo male pt with PMHx of HTN, HLD, Prediabetic (no med), Chronic low back pain c/o low back pain radiating pain to right thigh for 3days. Denies injury but daily exercise including weight lifting. Reports he's had chronic low back pain for a while and noticed recurrent low back pain with radiating pain to hip and anterior thigh 3days ago. Denies fever, chills, or recent sickness. Denies CP/SOB/ABD pain or N/V. Denies urinary or bowel problems. He took 2 Aleve today without relief. He has an appointment with his pain management on Tuesday.

## 2023-09-22 NOTE — ED PROVIDER NOTE - PHYSICAL EXAMINATION
NAD. VSS. Afebrile. Neck supple. Lungs clear. No C/T/L spinal midline tender. +Right hip tender with full ROMs. No knee tender. FUll ROMs of knee. N/V- intact. No peripheral edema or calf tender. Neuro- intact.

## 2023-09-22 NOTE — ED ADULT NURSE NOTE - OBJECTIVE STATEMENT
Patient c/o pain in back and right hip that makes it difficult to ambulate. Patient reports that pain is primarily in hip and reports some relief when putting pressure on certain spot on hip. Patient was told in past that he may need to get right hip replacement. Patient took motrin this morning with no relief. Patient A&Ox4, ambulatory at baseline. No signs of acute distress/. plan of care in progress.

## 2023-09-22 NOTE — ED PROVIDER NOTE - CLINICAL SUMMARY MEDICAL DECISION MAKING FREE TEXT BOX
Attending MD Tolentino:  69yo male pt with PMHx of HTN, HLD, Prediabetic (no med), Chronic low back pain c/o low back pain radiating pain to right thigh for 3days. Denies injury but daily exercise including weight lifting. Reports he's had chronic low back pain for a while and now radiating pain to hip and anterior thigh 3days ago. Mild right hip tenderness to palpation with full range of motion.  No deformities.  Able to ambulate. Neuro intact.  No C/T/L midline spine tenderness to palpation.  DDX sciatic, bursitis, msk strain, doubt fx unless pathologic.  Will give pain meds get xrays and re-eval.

## 2023-09-22 NOTE — ED PROVIDER NOTE - PATIENT PORTAL LINK FT
You can access the FollowMyHealth Patient Portal offered by Health system by registering at the following website: [email or web address]. By joining [employer]’s FollowMyHealth portal, you will also be able to view your health information using other applications (apps) compatible with our system.

## 2023-09-22 NOTE — ED ADULT NURSE NOTE - NSFALLRISKINTERV_ED_ALL_ED
Assistance OOB with selected safe patient handling equipment if applicable/Assistance with ambulation/Communicate fall risk and risk factors to all staff, patient, and family/Monitor gait and stability/Provide visual cue: yellow wristband, yellow gown, etc/Reinforce activity limits and safety measures with patient and family/Call bell, personal items and telephone in reach/Instruct patient to call for assistance before getting out of bed/chair/stretcher/Non-slip footwear applied when patient is off stretcher/[place] to call system/Physically safe environment - no spills, clutter or unnecessary equipment/Purposeful Proactive Rounding/Room/bathroom lighting operational, light cord in reach

## 2023-09-26 ENCOUNTER — RX RENEWAL (OUTPATIENT)
Age: 68
End: 2023-09-26

## 2023-10-06 ENCOUNTER — APPOINTMENT (OUTPATIENT)
Dept: SURGERY | Facility: CLINIC | Age: 68
End: 2023-10-06
Payer: MEDICARE

## 2023-10-06 VITALS
TEMPERATURE: 96.3 F | HEIGHT: 64 IN | HEART RATE: 88 BPM | RESPIRATION RATE: 18 BRPM | OXYGEN SATURATION: 97 % | SYSTOLIC BLOOD PRESSURE: 145 MMHG | BODY MASS INDEX: 29.37 KG/M2 | WEIGHT: 172 LBS | DIASTOLIC BLOOD PRESSURE: 95 MMHG

## 2023-10-06 DIAGNOSIS — K64.9 UNSPECIFIED HEMORRHOIDS: ICD-10-CM

## 2023-10-06 PROCEDURE — 99213 OFFICE O/P EST LOW 20 MIN: CPT | Mod: 25

## 2023-10-06 PROCEDURE — 46221 LIGATION OF HEMORRHOID(S): CPT

## 2023-12-06 ENCOUNTER — RX RENEWAL (OUTPATIENT)
Age: 68
End: 2023-12-06

## 2024-01-03 ENCOUNTER — RX RENEWAL (OUTPATIENT)
Age: 69
End: 2024-01-03

## 2024-02-29 ENCOUNTER — RX RENEWAL (OUTPATIENT)
Age: 69
End: 2024-02-29

## 2024-06-07 ENCOUNTER — RX RENEWAL (OUTPATIENT)
Age: 69
End: 2024-06-07

## 2024-06-07 RX ORDER — OMEPRAZOLE 40 MG/1
40 CAPSULE, DELAYED RELEASE ORAL
Qty: 30 | Refills: 2 | Status: ACTIVE | COMMUNITY
Start: 2019-02-06 | End: 1900-01-01

## 2024-09-06 ENCOUNTER — RX RENEWAL (OUTPATIENT)
Age: 69
End: 2024-09-06